# Patient Record
Sex: MALE | Race: OTHER | NOT HISPANIC OR LATINO | URBAN - METROPOLITAN AREA
[De-identification: names, ages, dates, MRNs, and addresses within clinical notes are randomized per-mention and may not be internally consistent; named-entity substitution may affect disease eponyms.]

---

## 2017-11-15 ENCOUNTER — EMERGENCY (EMERGENCY)
Facility: HOSPITAL | Age: 27
LOS: 1 days | Discharge: HOME | End: 2017-11-15
Attending: EMERGENCY MEDICINE | Admitting: EMERGENCY MEDICINE
Payer: SELF-PAY

## 2017-11-15 VITALS
SYSTOLIC BLOOD PRESSURE: 130 MMHG | HEART RATE: 80 BPM | DIASTOLIC BLOOD PRESSURE: 74 MMHG | RESPIRATION RATE: 14 BRPM | OXYGEN SATURATION: 99 % | TEMPERATURE: 98 F

## 2017-11-15 VITALS
HEART RATE: 88 BPM | TEMPERATURE: 98 F | WEIGHT: 179.9 LBS | SYSTOLIC BLOOD PRESSURE: 145 MMHG | DIASTOLIC BLOOD PRESSURE: 82 MMHG | OXYGEN SATURATION: 98 % | RESPIRATION RATE: 14 BRPM | HEIGHT: 67 IN

## 2017-11-15 PROCEDURE — 99284 EMERGENCY DEPT VISIT MOD MDM: CPT

## 2017-11-15 PROCEDURE — 72100 X-RAY EXAM L-S SPINE 2/3 VWS: CPT | Mod: 26

## 2017-11-15 PROCEDURE — 72100 X-RAY EXAM L-S SPINE 2/3 VWS: CPT

## 2017-11-15 PROCEDURE — 99283 EMERGENCY DEPT VISIT LOW MDM: CPT | Mod: 25

## 2017-11-15 RX ORDER — CYCLOBENZAPRINE HYDROCHLORIDE 10 MG/1
1 TABLET, FILM COATED ORAL
Qty: 21 | Refills: 0 | OUTPATIENT
Start: 2017-11-15 | End: 2017-11-22

## 2017-11-15 RX ORDER — CYCLOBENZAPRINE HYDROCHLORIDE 10 MG/1
10 TABLET, FILM COATED ORAL ONCE
Qty: 0 | Refills: 0 | Status: COMPLETED | OUTPATIENT
Start: 2017-11-15 | End: 2017-11-15

## 2017-11-15 RX ORDER — IBUPROFEN 200 MG
800 TABLET ORAL ONCE
Qty: 0 | Refills: 0 | Status: COMPLETED | OUTPATIENT
Start: 2017-11-15 | End: 2017-11-15

## 2017-11-15 RX ORDER — IBUPROFEN 200 MG
1 TABLET ORAL
Qty: 20 | Refills: 0 | OUTPATIENT
Start: 2017-11-15 | End: 2017-11-20

## 2017-11-15 RX ADMIN — CYCLOBENZAPRINE HYDROCHLORIDE 10 MILLIGRAM(S): 10 TABLET, FILM COATED ORAL at 21:05

## 2017-11-15 RX ADMIN — Medication 800 MILLIGRAM(S): at 21:07

## 2017-11-15 RX ADMIN — Medication 800 MILLIGRAM(S): at 21:05

## 2017-11-15 NOTE — ED ADULT TRIAGE NOTE - CHIEF COMPLAINT QUOTE
"I am having back pain."  pt has lower back pain to left side; has had pain "for a while" but it is worse today

## 2017-11-15 NOTE — ED PROVIDER NOTE - MUSCULOSKELETAL MINIMAL EXAM
left lower lumbar paraspinal tenderness; no midline tenderness/normal range of motion/motor intact/neck supple/atraumatic

## 2017-11-15 NOTE — ED PROVIDER NOTE - OBJECTIVE STATEMENT
27 yo male c/o left lower back pain x 2 months. Denies any known injury, but pain when moving, turning or bending. denies any fever or chills. denies any weakness to legs. denies any problem with bladder or bowel function

## 2017-11-15 NOTE — ED ADULT NURSE NOTE - OBJECTIVE STATEMENT
alert oriented x4 no distreass c/o lower bcack pain pt evaluated awaiting xray pt ambulating with steady gait

## 2019-07-25 NOTE — ED ADULT TRIAGE NOTE - CCCP TRG CHIEF CMPLNT
back pain general
Transportation Risk (There is always a risk of traffic delays resulting in deterioration of condition.)/Deterioration of Condition En Route/Increased Pain/Death or Disability